# Patient Record
Sex: FEMALE | Race: WHITE | NOT HISPANIC OR LATINO
[De-identification: names, ages, dates, MRNs, and addresses within clinical notes are randomized per-mention and may not be internally consistent; named-entity substitution may affect disease eponyms.]

---

## 2023-07-27 ENCOUNTER — APPOINTMENT (OUTPATIENT)
Dept: ORTHOPEDIC SURGERY | Facility: CLINIC | Age: 18
End: 2023-07-27
Payer: COMMERCIAL

## 2023-07-27 VITALS — HEIGHT: 62 IN | WEIGHT: 140 LBS | BODY MASS INDEX: 25.76 KG/M2

## 2023-07-27 DIAGNOSIS — S90.31XA CONTUSION OF RIGHT FOOT, INITIAL ENCOUNTER: ICD-10-CM

## 2023-07-27 DIAGNOSIS — S90.121A CONTUSION OF RIGHT FOOT, INITIAL ENCOUNTER: ICD-10-CM

## 2023-07-27 PROBLEM — Z00.00 ENCOUNTER FOR PREVENTIVE HEALTH EXAMINATION: Status: ACTIVE | Noted: 2023-07-27

## 2023-07-27 PROCEDURE — L4361: CPT | Mod: RT

## 2023-07-27 PROCEDURE — 73630 X-RAY EXAM OF FOOT: CPT | Mod: RT

## 2023-07-27 PROCEDURE — 99204 OFFICE O/P NEW MOD 45 MIN: CPT

## 2023-08-01 ENCOUNTER — APPOINTMENT (OUTPATIENT)
Dept: ORTHOPEDIC SURGERY | Facility: CLINIC | Age: 18
End: 2023-08-01
Payer: COMMERCIAL

## 2023-08-01 VITALS — HEIGHT: 69 IN | WEIGHT: 140 LBS | BODY MASS INDEX: 20.73 KG/M2

## 2023-08-01 DIAGNOSIS — Z78.9 OTHER SPECIFIED HEALTH STATUS: ICD-10-CM

## 2023-08-01 PROBLEM — S90.31XA CONTUSION OF FOOT INCLUDING TOES, RIGHT, INITIAL ENCOUNTER: Status: ACTIVE | Noted: 2023-08-01

## 2023-08-01 PROCEDURE — 99213 OFFICE O/P EST LOW 20 MIN: CPT

## 2023-08-01 NOTE — PHYSICAL EXAM
[Right] : right foot and ankle [Mild] : mild swelling of dorsal foot [NL (40)] : plantar flexion 40 degrees [NL 30)] : inversion 30 degrees [NL (20)] : eversion 20 degrees [5___] : eversion 5[unfilled]/5 [2+] : dorsalis pedis pulse: 2+ [] : patient ambulates without assistive device [FreeTextEntry8] : mild dorsal foot ttp

## 2023-08-01 NOTE — HISTORY OF PRESENT ILLNESS
[5] : 5 [3] : 3 [Dull/Aching] : dull/aching [Sharp] : sharp [Intermittent] : intermittent [Rest] : rest [Walking] : walking [de-identified] : 08/01/2023: 1 week foot pain from dropping a computer hard drive on her foot. went to [facility] urgent care and had xrays done. wb in boot. no prior foot sig foot probs. denies dm/tob.  [] : Post Surgical Visit: no [FreeTextEntry1] : RT foot  [FreeTextEntry3] : 7/25/23

## 2023-08-01 NOTE — HISTORY OF PRESENT ILLNESS
[Student] : Work status: student [6] : 6 [4] : 4 [de-identified] : 7/27/23: 17 yo female with right foot pain since 7/25/23. She states she was carrying a computer and it dropped on her foot. She has pain with walking. She iced and took tylenol. [] : Post Surgical Visit: no [FreeTextEntry1] : R foot [FreeTextEntry5] : computer monitor dropped on right foot two days ago, no change in pain level. pain when driving

## 2023-08-01 NOTE — PHYSICAL EXAM
[Right] : right foot and ankle [Mild] : mild swelling of dorsal foot [3rd] : 3rd [4th] : 4th [5___] : eversion 5[unfilled]/5 [] : patient ambulates with assistive device

## 2023-08-01 NOTE — ASSESSMENT
[FreeTextEntry1] : Recommend CAM boot for ambulation. Ice, elvate. NSAIDS prn. REcommend follow up with foot specialist before going to track and field camp.

## 2023-08-01 NOTE — DATA REVIEWED
[Outside X-rays] : outside x-rays [Right] : of the right [Foot] : foot [I reviewed the films/CD and additionally noted] : I reviewed the films/CD and additionally noted [FreeTextEntry1] : no acute fx; old appearing dorsal avulsion fx

## 2025-09-17 ENCOUNTER — NON-APPOINTMENT (OUTPATIENT)
Age: 20
End: 2025-09-17